# Patient Record
Sex: MALE | Race: WHITE | NOT HISPANIC OR LATINO | Employment: FULL TIME | URBAN - METROPOLITAN AREA
[De-identification: names, ages, dates, MRNs, and addresses within clinical notes are randomized per-mention and may not be internally consistent; named-entity substitution may affect disease eponyms.]

---

## 2024-07-09 ENCOUNTER — HOSPITAL ENCOUNTER (OUTPATIENT)
Dept: RADIOLOGY | Facility: CLINIC | Age: 44
Discharge: HOME | End: 2024-07-09
Payer: MEDICAID

## 2024-07-09 ENCOUNTER — OFFICE VISIT (OUTPATIENT)
Dept: ORTHOPEDIC SURGERY | Facility: CLINIC | Age: 44
End: 2024-07-09
Payer: MEDICAID

## 2024-07-09 DIAGNOSIS — M65.331 TRIGGER MIDDLE FINGER OF RIGHT HAND: ICD-10-CM

## 2024-07-09 DIAGNOSIS — M79.641 PAIN OF RIGHT HAND: ICD-10-CM

## 2024-07-09 DIAGNOSIS — M65.321 TRIGGER INDEX FINGER OF RIGHT HAND: Primary | ICD-10-CM

## 2024-07-09 DIAGNOSIS — M65.341 TRIGGER RING FINGER OF RIGHT HAND: ICD-10-CM

## 2024-07-09 PROCEDURE — 99204 OFFICE O/P NEW MOD 45 MIN: CPT | Performed by: ORTHOPAEDIC SURGERY

## 2024-07-09 PROCEDURE — 1036F TOBACCO NON-USER: CPT | Performed by: ORTHOPAEDIC SURGERY

## 2024-07-09 PROCEDURE — 73130 X-RAY EXAM OF HAND: CPT | Mod: RIGHT SIDE | Performed by: RADIOLOGY

## 2024-07-09 PROCEDURE — 99214 OFFICE O/P EST MOD 30 MIN: CPT | Performed by: ORTHOPAEDIC SURGERY

## 2024-07-09 PROCEDURE — 73130 X-RAY EXAM OF HAND: CPT | Mod: RT

## 2024-07-09 RX ORDER — METHYLPREDNISOLONE 4 MG/1
TABLET ORAL
Qty: 21 TABLET | Refills: 0 | Status: SHIPPED | OUTPATIENT
Start: 2024-07-09

## 2024-07-09 NOTE — PROGRESS NOTES
CHIEF COMPLAINT         Right hand pain    ASSESSMENT + PLAN    Right long, index, and ring trigger digits    The nature of trigger finger was reviewed, along with the natural history.  I reviewed the options for management, including observation, nonsteroidals, splinting, oral steroids, cortisone injection, or surgical release, along with the likely success rates of each.      The increased utility of oral medication for treating multiple triggers was discussed, along with the possible necessity for more targeted intervention with cortisone injection or surgery down the road for any digits that fail to respond.    I reviewed the risks of oral steroids, including GI upset, adrenal suppression, and bone thinning, though these risks are minimal for a short course like a Dosepak.  The transient elevation of blood glucose measurement was also reviewed.      A prescription for Medrol Dosepak was electronically transmitted to their pharmacy of choice.    Patient will followup after completing the course if still symptomatic, or with any concerns.  Paperwork was completed for return to work full duty on July 22.  Please forward any additional necessary paperwork.        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  Over the last 3 to 6 months he has had intermittent episodes of multiple digits locking and flexion in the right hand.  No single specific recalled direct trauma.  It is primarily the long finger but occasionally index or ring.  They do occasionally require use of the opposite hand for extension, especially in the morning.  No similar problems on the other side.  He finds he can avoid problems if he does not fully flex the fingers.  No treatment so far other than simple observation.    He is not diabetic or hypothyroid.  He does not smoke.      REVIEW OF SYSTEMS       A 30-item  multi-system Review Of Systems was obtained on today's intake form.  This was reviewed with the patient and is correct.  The pertinent positives and negatives are listed above.  The form has been scanned separately into the medical record.      PHYSICAL EXAM    Constitutional:    Appears stated age. Well-developed and well-nourished male in no acute distress.  Psychiatric:         Pleasant normal mood and affect. Behavior is appropriate for the situation.   Head:                   Normocephalic and atraumatic.  Eyes:                    Pupils are equal and round.  Cardiovascular:  2+ radial and ulnar pulses. Fingers well-perfused.  Respiratory:        Effort normal. No respiratory distress. Speaking in complete sentences.  Neurologic:       Alert and oriented to person, place, and time.  Skin:                Skin is intact, warm and dry.  Hematologic / Lymphatic:    No lymphedema or lymphangitis.    Extremities / Musculoskeletal:                      Skin of the right hand and wrist is intact with no erythema, ecchymosis, or diffuse swelling.  Normal skin drag and coloration.  Full composite finger extension.  He is hesitant to fully composite flex but he can get there actively.  Obvious triggering of the long finger.  Palpable flexor nodule at index, long, ring with A1 tenderness at each.  Ring and index do not spontaneously trigger today in the office.  Full intrinsic plus and minus posture.  Symmetric wrist and forearm motion.  Negative Randolph.  Negative midcarpal shift.  Sensation intact to light touch in all distributions.  Capillary refill less than 2 seconds.      IMAGING / LABS / EMGs           X-rays right hand ordered and independently interpreted by me today show no acute fracture, subluxation, or foreign body.  Joints are concentric and well-preserved.  Incidental note is made of a small bone island in the middle phalanx of the right index, a common anatomic variant.      No past medical history on  file.    Medication Documentation Review Audit    **Prior to Admission medications have not yet been reviewed**         Not on File    Social History     Socioeconomic History    Marital status:      Spouse name: Not on file    Number of children: Not on file    Years of education: Not on file    Highest education level: Not on file   Occupational History    Not on file   Tobacco Use    Smoking status: Not on file    Smokeless tobacco: Not on file   Substance and Sexual Activity    Alcohol use: Not on file    Drug use: Not on file    Sexual activity: Not on file   Other Topics Concern    Not on file   Social History Narrative    Not on file     Social Determinants of Health     Financial Resource Strain: Not on File (2023)    Received from [facility]    Financial Resource Strain     Financial Resource Strain: 0   Food Insecurity: Not on File (2023)    Received from [facility]    Food Insecurity     Food: 0   Transportation Needs: Not on File (2023)    Received from [facility]    Transportation Needs     Transportation: 0   Physical Activity: Not on File (2023)    Received from [facility]    Physical Activity     Physical Activity: 0   Stress: Not on File (2023)    Received from [facility]    Stress     Stress: 0   Social Connections: Not on File (2023)    Received from [facility]    Social Connections     Social Connections and Isolation: 0   Intimate Partner Violence: Not on file   Housing Stability: Not on File (2023)    Received from [facility]    Housing Stability     Housin       No past surgical history on file.      Electronically Signed      SHERYL Kaufman MD      Orthopaedic Hand Surgery      [phone]

## 2024-07-09 NOTE — LETTER
July 22, 2024     No Recipients    Patient: Jarret Paul   YOB: 1980   Date of Visit: 7/9/2024       Dear Dr. Garcia Recipients:    Thank you for referring Jarret Paul to me for evaluation. Below are my notes for this consultation.  If you have questions, please do not hesitate to call me. I look forward to following your patient along with you.       Sincerely,     Scotty Kaufman MD      CC: No Recipients  ______________________________________________________________________________________    CHIEF COMPLAINT         Right hand pain    ASSESSMENT + PLAN    Right long, index, and ring trigger digits    The nature of trigger finger was reviewed, along with the natural history.  I reviewed the options for management, including observation, nonsteroidals, splinting, oral steroids, cortisone injection, or surgical release, along with the likely success rates of each.      The increased utility of oral medication for treating multiple triggers was discussed, along with the possible necessity for more targeted intervention with cortisone injection or surgery down the road for any digits that fail to respond.    I reviewed the risks of oral steroids, including GI upset, adrenal suppression, and bone thinning, though these risks are minimal for a short course like a Dosepak.  The transient elevation of blood glucose measurement was also reviewed.      A prescription for Medrol Dosepak was electronically transmitted to their pharmacy of choice.    Patient will followup after completing the course if still symptomatic, or with any concerns.  Paperwork was completed for return to work full duty on July 22.  Please forward any additional necessary paperwork.        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  Over the last 3 to 6 months he has had intermittent episodes of  multiple digits locking and flexion in the right hand.  No single specific recalled direct trauma.  It is primarily the long finger but occasionally index or ring.  They do occasionally require use of the opposite hand for extension, especially in the morning.  No similar problems on the other side.  He finds he can avoid problems if he does not fully flex the fingers.  No treatment so far other than simple observation.    He is not diabetic or hypothyroid.  He does not smoke.      REVIEW OF SYSTEMS       A 30-item multi-system Review Of Systems was obtained on today's intake form.  This was reviewed with the patient and is correct.  The pertinent positives and negatives are listed above.  The form has been scanned separately into the medical record.      PHYSICAL EXAM    Constitutional:    Appears stated age. Well-developed and well-nourished male in no acute distress.  Psychiatric:         Pleasant normal mood and affect. Behavior is appropriate for the situation.   Head:                   Normocephalic and atraumatic.  Eyes:                    Pupils are equal and round.  Cardiovascular:  2+ radial and ulnar pulses. Fingers well-perfused.  Respiratory:        Effort normal. No respiratory distress. Speaking in complete sentences.  Neurologic:       Alert and oriented to person, place, and time.  Skin:                Skin is intact, warm and dry.  Hematologic / Lymphatic:    No lymphedema or lymphangitis.    Extremities / Musculoskeletal:                      Skin of the right hand and wrist is intact with no erythema, ecchymosis, or diffuse swelling.  Normal skin drag and coloration.  Full composite finger extension.  He is hesitant to fully composite flex but he can get there actively.  Obvious triggering of the long finger.  Palpable flexor nodule at index, long, ring with A1 tenderness at each.  Ring and index do not spontaneously trigger today in the office.  Full intrinsic plus and minus posture.  Symmetric  wrist and forearm motion.  Negative Randolph.  Negative midcarpal shift.  Sensation intact to light touch in all distributions.  Capillary refill less than 2 seconds.      IMAGING / LABS / EMGs           X-rays right hand ordered and independently interpreted by me today show no acute fracture, subluxation, or foreign body.  Joints are concentric and well-preserved.  Incidental note is made of a small bone island in the middle phalanx of the right index, a common anatomic variant.      No past medical history on file.    Medication Documentation Review Audit    **Prior to Admission medications have not yet been reviewed**         Not on File    Social History     Socioeconomic History   • Marital status:      Spouse name: Not on file   • Number of children: Not on file   • Years of education: Not on file   • Highest education level: Not on file   Occupational History   • Not on file   Tobacco Use   • Smoking status: Not on file   • Smokeless tobacco: Not on file   Substance and Sexual Activity   • Alcohol use: Not on file   • Drug use: Not on file   • Sexual activity: Not on file   Other Topics Concern   • Not on file   Social History Narrative   • Not on file     Social Determinants of Health     Financial Resource Strain: Not on File (6/28/2023)    Received from [facility]    Financial Resource Strain    • Financial Resource Strain: 0   Food Insecurity: Not on File (6/28/2023)    Received from [facility]    Food Insecurity    • Food: 0   Transportation Needs: Not on File (6/28/2023)    Received from [facility]    Transportation Needs    • Transportation: 0   Physical Activity: Not on File (6/28/2023)    Received from [facility]    Physical Activity    • Physical Activity: 0   Stress: Not on File (6/28/2023)    Received from [facility]    Stress    • Stress: 0   Social Connections: Not on File (6/28/2023)    Received from [facility]    Social Connections    • Social Connections and Isolation: 0   Intimate Partner Violence: Not on file    Housing Stability: Not on File (2023)    Received from [facility]    • Housin       No past surgical history on file.      Electronically Signed      SHERYL Kaufman MD      Orthopaedic Hand Surgery      [phone]

## 2024-07-19 ENCOUNTER — OFFICE VISIT (OUTPATIENT)
Dept: ORTHOPEDIC SURGERY | Facility: HOSPITAL | Age: 44
End: 2024-07-19
Payer: MEDICAID

## 2024-07-19 DIAGNOSIS — M65.331 TRIGGER MIDDLE FINGER OF RIGHT HAND: ICD-10-CM

## 2024-07-19 DIAGNOSIS — M65.331 TRIGGER FINGER, RIGHT MIDDLE FINGER: ICD-10-CM

## 2024-07-19 DIAGNOSIS — M65.341 TRIGGER RING FINGER OF RIGHT HAND: Primary | ICD-10-CM

## 2024-07-19 DIAGNOSIS — M65.321 TRIGGER INDEX FINGER OF RIGHT HAND: ICD-10-CM

## 2024-07-19 PROCEDURE — 1036F TOBACCO NON-USER: CPT | Performed by: ORTHOPAEDIC SURGERY

## 2024-07-19 NOTE — LETTER
July 27, 2024     NEIL Smallwood  [address] [facility] [id number]    Patient: Jarret Paul   YOB: 1980   Date of Visit: 7/19/2024       Dear NEIL Wu:    Thank you for referring Jarret Paul to me for evaluation. Below are my notes for this consultation.  If you have questions, please do not hesitate to call me. I look forward to following your patient along with you.       Sincerely,     Scotty Kaufman MD      CC: No Recipients  ______________________________________________________________________________________    CHIEF COMPLAINT         Resolving triggers    ASSESSMENT + PLAN    Markedly improved right index, long, and ring trigger fingers after Medrol Dosepak    The medication is still in your system and should continue to improve things from here.  If things are not improving after another couple of weeks, please contact my office so the cortisone injection can be arranged.  Advance activity as symptoms allow, with no particular restrictions.    Follow-up with any additional concerns.  I think you are fine for return to work from here as planned.        HISTORY OF PRESENT ILLNESS       Patient returns today, as there was some confusion about scheduling.  He did complete the Medrol Dosepak.  The index and ring fingers are now asymptomatic.  There is just a slight morning pop in the long finger that rapidly loosens up.  He is very pleased with his outcome.  No interval trauma.  No other new concerns.      PHYSICAL EXAM       He remains well-developed, well-nourished male in no acute distress.  He appears his stated age and has a pleasant affect.  He demonstrates full composite finger flexion extension in all digits on the right hand.  Full intrinsic plus minus posture.  Tendons are intact.  No obvious triggering, even with sequential extension test today.  Symmetric wrist and forearm motion.  Sensation intact to light touch  in all distributions.  Capillary refill less than 2 seconds.      IMAGING / LABS / EMGs        None today      Electronically Signed      SHERYL Kaufman MD      Orthopaedic Hand Surgery      [phone]

## 2024-07-28 NOTE — PROGRESS NOTES
CHIEF COMPLAINT         Resolving triggers    ASSESSMENT + PLAN    Markedly improved right index, long, and ring trigger fingers after Medrol Dosepak    The medication is still in your system and should continue to improve things from here.  If things are not improving after another couple of weeks, please contact my office so the cortisone injection can be arranged.  Advance activity as symptoms allow, with no particular restrictions.    Follow-up with any additional concerns.  I think you are fine for return to work from here as planned.        HISTORY OF PRESENT ILLNESS       Patient returns today, as there was some confusion about scheduling.  He did complete the Medrol Dosepak.  The index and ring fingers are now asymptomatic.  There is just a slight morning pop in the long finger that rapidly loosens up.  He is very pleased with his outcome.  No interval trauma.  No other new concerns.      PHYSICAL EXAM       He remains well-developed, well-nourished male in no acute distress.  He appears his stated age and has a pleasant affect.  He demonstrates full composite finger flexion extension in all digits on the right hand.  Full intrinsic plus minus posture.  Tendons are intact.  No obvious triggering, even with sequential extension test today.  Symmetric wrist and forearm motion.  Sensation intact to light touch in all distributions.  Capillary refill less than 2 seconds.      IMAGING / LABS / EMGs        None today      Electronically Signed      SHERYL Kaufman MD      Orthopaedic Hand Surgery      [phone]

## 2025-02-19 ENCOUNTER — HOSPITAL ENCOUNTER (OUTPATIENT)
Dept: RADIOLOGY | Facility: CLINIC | Age: 45
Discharge: HOME | End: 2025-02-19
Payer: MEDICAID

## 2025-02-19 DIAGNOSIS — Z86.16 PERSONAL HISTORY OF COVID-19: ICD-10-CM

## 2025-02-19 PROCEDURE — 71046 X-RAY EXAM CHEST 2 VIEWS: CPT

## 2025-04-02 ENCOUNTER — APPOINTMENT (OUTPATIENT)
Dept: BEHAVIORAL HEALTH | Facility: CLINIC | Age: 45
End: 2025-04-02
Payer: MEDICAID

## 2025-04-30 ENCOUNTER — APPOINTMENT (OUTPATIENT)
Dept: RADIOLOGY | Facility: HOSPITAL | Age: 45
End: 2025-04-30
Payer: MEDICAID

## 2025-05-06 ENCOUNTER — HOSPITAL ENCOUNTER (OUTPATIENT)
Dept: RADIOLOGY | Facility: HOSPITAL | Age: 45
End: 2025-05-06
Payer: MEDICAID

## 2025-05-06 ENCOUNTER — APPOINTMENT (OUTPATIENT)
Dept: RADIOLOGY | Facility: HOSPITAL | Age: 45
End: 2025-05-06
Payer: MEDICAID

## 2025-05-08 ENCOUNTER — HOSPITAL ENCOUNTER (OUTPATIENT)
Dept: RADIOLOGY | Facility: HOSPITAL | Age: 45
Discharge: HOME | End: 2025-05-08
Payer: MEDICAID

## 2025-05-08 DIAGNOSIS — R39.9 UNSPECIFIED SYMPTOMS AND SIGNS INVOLVING THE GENITOURINARY SYSTEM: ICD-10-CM

## 2025-05-08 DIAGNOSIS — R19.00 INTRA-ABDOMINAL AND PELVIC SWELLING, MASS AND LUMP, UNSPECIFIED SITE: ICD-10-CM

## 2025-05-08 PROCEDURE — 76870 US EXAM SCROTUM: CPT

## 2025-06-11 ENCOUNTER — APPOINTMENT (OUTPATIENT)
Dept: UROLOGY | Facility: HOSPITAL | Age: 45
End: 2025-06-11
Payer: MEDICAID

## 2025-06-17 ENCOUNTER — APPOINTMENT (OUTPATIENT)
Dept: UROLOGY | Facility: CLINIC | Age: 45
End: 2025-06-17
Payer: MEDICAID